# Patient Record
Sex: MALE | Race: WHITE | Employment: STUDENT | URBAN - METROPOLITAN AREA
[De-identification: names, ages, dates, MRNs, and addresses within clinical notes are randomized per-mention and may not be internally consistent; named-entity substitution may affect disease eponyms.]

---
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
## 2025-07-03 ENCOUNTER — HOSPITAL ENCOUNTER (EMERGENCY)
Facility: HOSPITAL | Age: 20
Discharge: HOME | End: 2025-07-03
Attending: EMERGENCY MEDICINE
Payer: COMMERCIAL

## 2025-07-03 VITALS
TEMPERATURE: 98 F | HEART RATE: 78 BPM | OXYGEN SATURATION: 98 % | RESPIRATION RATE: 18 BRPM | WEIGHT: 188 LBS | BODY MASS INDEX: 23.38 KG/M2 | HEIGHT: 75 IN | SYSTOLIC BLOOD PRESSURE: 145 MMHG | DIASTOLIC BLOOD PRESSURE: 81 MMHG

## 2025-07-03 DIAGNOSIS — L03.116 CELLULITIS OF LEFT LEG: Primary | ICD-10-CM

## 2025-07-03 PROCEDURE — 99283 EMERGENCY DEPT VISIT LOW MDM: CPT

## 2025-07-03 PROCEDURE — 99284 EMERGENCY DEPT VISIT MOD MDM: CPT

## 2025-07-03 RX ORDER — SULFAMETHOXAZOLE AND TRIMETHOPRIM 800; 160 MG/1; MG/1
1 TABLET ORAL ONCE
Status: COMPLETED | OUTPATIENT
Start: 2025-07-03 | End: 2025-07-03

## 2025-07-03 RX ORDER — SULFAMETHOXAZOLE AND TRIMETHOPRIM 800; 160 MG/1; MG/1
1 TABLET ORAL 2 TIMES DAILY
Qty: 20 TABLET | Refills: 0 | Status: SHIPPED | OUTPATIENT
Start: 2025-07-03

## 2025-07-04 NOTE — ED PROVIDER NOTES
Patient Seen in: [facility] Emergency Department        History  Chief Complaint   Patient presents with    Bite Sting,Insect     On lower left leg - noticed 3 days ago- became inflamed, hot and swollen     Stated Complaint: Bite    Subjective:   HPI            Patient is a 19-year-old male here with left leg redness.  Noted about 2 days ago.  Patient was working in his shed and basement.  Had gone golfing the next day.  Noted some slight redness and it increased today with some slight swelling and pain.  No itching.  May have scratched it or bug bite but patient does not recall any definitive incidents.  No other complaints      Objective:     History reviewed. No pertinent past medical history.           Past Surgical History:   Procedure Laterality Date    Hc implant ear tubes  2004                Social History     Socioeconomic History    Marital status: Single   Tobacco Use    Smoking status: Never    Smokeless tobacco: Never     Social Drivers of Health      Received from [facility]    Housing Stability                                Physical Exam    ED Triage Vitals [07/03/25 2104]   /81   Pulse 78   Resp 18   Temp 98.4 °F (36.9 °C)   Temp src Temporal   SpO2 98 %   O2 Device None (Room air)       Current Vitals:   Vital Signs  BP: 145/81  Pulse: 78  Resp: 18  Temp: 98.4 °F (36.9 °C)  Temp src: Temporal    Oxygen Therapy  SpO2: 98 %  O2 Device: None (Room air)            Physical Exam  Vitals and nursing note reviewed.   Musculoskeletal:         General: No swelling, tenderness, deformity or signs of injury. Normal range of motion.   Skin:     Findings: Erythema (Area of cellulitis noted in the left mid tibial region measuring about 4 x 5 cm.  No fluctuance.) present.                ED Course  Labs Reviewed - No data to display                          MDM          -History source other than patient -mother        -Comorbidities did add complexity to the management are  mentioned in the HPI above        -I personally reviewed the prior external notes and the medical record to obtain additional history reviewed pediatric note  July 23, patient seen for palpitations.  Given cardiology referral.  Has anxiety palpitations for 6 months.        -DDX: Includes but not limited to cellulitis, abscess, which is a medical condition that can pose a threat to life/function      Nothing to suggest necrotizing soft tissue infection.  Patient given Bactrim.  Recommended follow-up in couple days for recheck.  Patient well-appearing nontoxic discharged home stable condition.          Medical Decision Making      Disposition and Plan     Clinical Impression:  1. Cellulitis of left leg         Disposition:  Discharge  7/3/2025 10:23 pm    Follow-up:  Bri Galvez MD  [address] [facility]  [address] [place] [id number]  [phone]    Schedule an appointment as soon as possible for a visit            Medications Prescribed:  Current Discharge Medication List        START taking these medications    Details   sulfamethoxazole-trimethoprim -160 MG Oral Tab per tablet Take 1 tablet by mouth 2 (two) times daily.  Qty: 20 tablet, Refills: 0                   Supplementary Documentation: